# Patient Record
(demographics unavailable — no encounter records)

---

## 2025-04-23 NOTE — PHYSICAL EXAM
[de-identified] : The patient is a well developed, well nourished male in no apparent distress. He is alert and oriented X 3 with a pleasant mood and appropriate affect.  On physical examination of the left knee, his ROM is 0-120 degres. The patient walks with a normal gait and stands in neutral alignment. There is no effusion. No warmth or erythema is noted. The patella is non tender to palpation medially or laterally. There is no crepitus noted. The apprehension and grind tests are negative. The extensor mechanism is intact. There is medial joint line tenderness. The Carolin sign is positive. The Lachman and pivot shift tests are negative. There is no varus or valgus laxity at 0 or 30 degrees. No posterolateral or anteromedial laxity is noted. No masses are palpable. No other soft tissue or bony tenderness is noted. Quadriceps weakness is noted. Neurovascular function is intact. [de-identified] : Radiographs of both knees show well alinged and well maintained joint spacing bilaterally

## 2025-04-23 NOTE — HISTORY OF PRESENT ILLNESS
[de-identified] : David Marrero is an 79 yo gentleman who presents with ongoing left knee issues for the last six weeks. He has right foot  issues that have caused him to alter his gait and put more stress on his left knee. he then developed progressive medial knee pain and stiffness. He denies any swelling, locking or buckling. he has been icing and resting with some mild improvement. He denies any previous knee issues. He has some pain with stairs

## 2025-04-23 NOTE — PHYSICAL EXAM
[de-identified] : The patient is a well developed, well nourished male in no apparent distress. He is alert and oriented X 3 with a pleasant mood and appropriate affect.  On physical examination of the left knee, his ROM is 0-120 degres. The patient walks with a normal gait and stands in neutral alignment. There is no effusion. No warmth or erythema is noted. The patella is non tender to palpation medially or laterally. There is no crepitus noted. The apprehension and grind tests are negative. The extensor mechanism is intact. There is medial joint line tenderness. The Carolin sign is positive. The Lachman and pivot shift tests are negative. There is no varus or valgus laxity at 0 or 30 degrees. No posterolateral or anteromedial laxity is noted. No masses are palpable. No other soft tissue or bony tenderness is noted. Quadriceps weakness is noted. Neurovascular function is intact. [de-identified] : Radiographs of both knees show well alinged and well maintained joint spacing bilaterally

## 2025-04-23 NOTE — END OF VISIT
[[email or web address]] : Dr Neal has reviewed the chart and agrees that the record accurately reflects his personal performance of the history, physical exam, assessment, and plan. He personally directed, reviewed, and agreed with the chart.All medical record  entries made by SHERRY Sharma, acting as a scribe for this encounter under the direction of Louie Neal MD

## 2025-04-23 NOTE — DISCUSSION/SUMMARY
[de-identified] : David has developed progressive medial knee pain consistent with a possible medial mensicus tear. He will begin a course of supervised PT. He will increase his activites as tolerated. we will see him back on an as needed basis. He will call if any issues arise

## 2025-04-23 NOTE — HISTORY OF PRESENT ILLNESS
[de-identified] : David Marrero is an 81 yo gentleman who presents with ongoing left knee issues for the last six weeks. He has right foot  issues that have caused him to alter his gait and put more stress on his left knee. he then developed progressive medial knee pain and stiffness. He denies any swelling, locking or buckling. he has been icing and resting with some mild improvement. He denies any previous knee issues. He has some pain with stairs